# Patient Record
Sex: FEMALE | Race: AMERICAN INDIAN OR ALASKA NATIVE | NOT HISPANIC OR LATINO | Employment: FULL TIME | URBAN - NONMETROPOLITAN AREA
[De-identification: names, ages, dates, MRNs, and addresses within clinical notes are randomized per-mention and may not be internally consistent; named-entity substitution may affect disease eponyms.]

---

## 2021-07-12 ENCOUNTER — TRANSFERRED RECORDS (OUTPATIENT)
Dept: HEALTH INFORMATION MANAGEMENT | Facility: OTHER | Age: 41
End: 2021-07-12

## 2021-07-15 ENCOUNTER — THERAPY VISIT (OUTPATIENT)
Dept: SLEEP MEDICINE | Facility: OTHER | Age: 41
End: 2021-07-15
Attending: NURSE PRACTITIONER
Payer: COMMERCIAL

## 2021-07-15 DIAGNOSIS — R06.83 SNORING: Primary | ICD-10-CM

## 2021-07-15 PROCEDURE — 95810 POLYSOM 6/> YRS 4/> PARAM: CPT | Performed by: INTERNAL MEDICINE

## 2021-08-02 ENCOUNTER — MEDICAL CORRESPONDENCE (OUTPATIENT)
Dept: HEALTH INFORMATION MANAGEMENT | Facility: OTHER | Age: 41
End: 2021-08-02

## 2021-08-23 ENCOUNTER — OFFICE VISIT (OUTPATIENT)
Dept: PULMONOLOGY | Facility: OTHER | Age: 41
End: 2021-08-23
Attending: INTERNAL MEDICINE
Payer: COMMERCIAL

## 2021-08-23 VITALS
HEART RATE: 84 BPM | OXYGEN SATURATION: 97 % | BODY MASS INDEX: 41.95 KG/M2 | WEIGHT: 293 LBS | DIASTOLIC BLOOD PRESSURE: 74 MMHG | RESPIRATION RATE: 17 BRPM | HEIGHT: 70 IN | SYSTOLIC BLOOD PRESSURE: 128 MMHG | TEMPERATURE: 98.2 F

## 2021-08-23 DIAGNOSIS — G47.33 OSA (OBSTRUCTIVE SLEEP APNEA): Primary | ICD-10-CM

## 2021-08-23 PROCEDURE — G0463 HOSPITAL OUTPT CLINIC VISIT: HCPCS

## 2021-08-23 RX ORDER — LISINOPRIL 10 MG/1
10 TABLET ORAL
COMMUNITY

## 2021-08-23 RX ORDER — ACETAMINOPHEN 325 MG/1
650 TABLET ORAL
COMMUNITY

## 2021-08-23 RX ORDER — LORATADINE 10 MG/1
10 TABLET ORAL
COMMUNITY

## 2021-08-23 ASSESSMENT — MIFFLIN-ST. JEOR: SCORE: 2191.22

## 2021-08-23 ASSESSMENT — PAIN SCALES - GENERAL: PAINLEVEL: NO PAIN (0)

## 2021-08-23 NOTE — PROGRESS NOTES
"Sleep Medicine  Note  Jeanne Malagon  August 23, 2021  [id number]    Chief Complaint: Mild sleep apnea moderate to severe during REM sleep    History of Present Illness: Jeanne Malagon is a 40 year old female presenting for above complaint.  She has a history of snoring.  She has socially disruptive snoring at times for her partner.  She awakens every few hours.  Her Malinta score is 6.  She has a history of hypertension and obesity.  Her sleep study performed July 15, 2021 showed mild sleep apnea overall however she had significant REM related sleep apnea.  She does awaken feeling tired at times.  Her Malinta score is 6.  She is  and works for the [employer].      Past Medical History:  No past medical history on file.    Medications:  Current Outpatient Medications   Medication     acetaminophen (TYLENOL) 325 MG tablet     lisinopril (ZESTRIL) 10 MG tablet     loratadine (CLARITIN) 10 MG tablet     guaiFENesin (ROBITUSSIN) 100 MG/5ML SYRP     No current facility-administered medications for this visit.       Physical Exam:  /74 (BP Location: Right arm, Patient Position: Sitting, Cuff Size: Adult Large)   Pulse 84   Temp 98.2  F (36.8  C) (Tympanic)   Resp 17   Ht 5' 10.08\" (1.78 m)   Wt 317 lb 6.4 oz (144 kg)   SpO2 97%   Breastfeeding No   BMI 45.44 kg/m    Limited   to vitals    Assessment and Plan:  40 year old female presenting for sleep apnea.  She is somewhat symptomatic.  She has socially disruptive snoring.  She does have hypertension.  The pros and cons of treating mild sleep apnea was discussed with the patient. With REM related sleep apnea awakenings every few hours are one of the symptoms which she has.  She would like to try CPAP.  She lives in [place].  Durable medical equipment provider options were discussed with [facility] being the closest.  [facility] titrate CPAP [facility].  We discussed a follow-up visit either " face-to-face or with telemedicine when she has been on CPAP for 31 days.    Cc: Pearl Darnell.

## 2021-08-23 NOTE — NURSING NOTE
"Chief Complaint   Patient presents with     Follow Up     Sleep study results       Initial /74 (BP Location: Right arm, Patient Position: Sitting, Cuff Size: Adult Large)   Pulse 84   Temp 98.2  F (36.8  C) (Tympanic)   Resp 17   Ht 5' 10.08\" (1.78 m)   Wt 317 lb 6.4 oz (144 kg)   SpO2 97%   Breastfeeding No   BMI 45.44 kg/m   Estimated body mass index is 45.44 kg/m  as calculated from the following:    Height as of this encounter: 5' 10.08\" (1.78 m).    Weight as of this encounter: 317 lb 6.4 oz (144 kg).  Medication Reconciliation: complete    FOOD SECURITY SCREENING QUESTIONS  Hunger Vital Signs:  Within the past 12 months we worried whether our food would run out before we got money to buy more. Never  Within the past 12 months the food we bought just didn't last and we didn't have money to get more. Never  Misa Moreno LPN 8/23/2021 3:43 PM    Misa Moreno LPN  "